# Patient Record
Sex: MALE | Race: WHITE | Employment: STUDENT | URBAN - METROPOLITAN AREA
[De-identification: names, ages, dates, MRNs, and addresses within clinical notes are randomized per-mention and may not be internally consistent; named-entity substitution may affect disease eponyms.]

---

## 2018-01-17 ENCOUNTER — OFFICE VISIT (OUTPATIENT)
Dept: FAMILY MEDICINE | Facility: CLINIC | Age: 23
End: 2018-01-17
Payer: COMMERCIAL

## 2018-01-17 VITALS
BODY MASS INDEX: 29.74 KG/M2 | HEART RATE: 82 BPM | TEMPERATURE: 99.1 F | DIASTOLIC BLOOD PRESSURE: 78 MMHG | OXYGEN SATURATION: 96 % | SYSTOLIC BLOOD PRESSURE: 126 MMHG | RESPIRATION RATE: 16 BRPM | WEIGHT: 178.5 LBS | HEIGHT: 65 IN

## 2018-01-17 DIAGNOSIS — Z23 NEED FOR PROPHYLACTIC VACCINATION AND INOCULATION AGAINST INFLUENZA: ICD-10-CM

## 2018-01-17 DIAGNOSIS — Z00.00 ENCOUNTER FOR ROUTINE ADULT HEALTH EXAMINATION WITHOUT ABNORMAL FINDINGS: Primary | ICD-10-CM

## 2018-01-17 DIAGNOSIS — K21.9 GASTROESOPHAGEAL REFLUX DISEASE WITHOUT ESOPHAGITIS: ICD-10-CM

## 2018-01-17 DIAGNOSIS — Z11.3 SCREENING EXAMINATION FOR VENEREAL DISEASE: ICD-10-CM

## 2018-01-17 LAB
ERYTHROCYTE [DISTWIDTH] IN BLOOD BY AUTOMATED COUNT: 13.5 % (ref 10–15)
HCT VFR BLD AUTO: 42.8 % (ref 40–53)
HGB BLD-MCNC: 14.7 G/DL (ref 13.3–17.7)
MCH RBC QN AUTO: 28.5 PG (ref 26.5–33)
MCHC RBC AUTO-ENTMCNC: 34.3 G/DL (ref 31.5–36.5)
MCV RBC AUTO: 83 FL (ref 78–100)
PLATELET # BLD AUTO: 231 10E9/L (ref 150–450)
RBC # BLD AUTO: 5.15 10E12/L (ref 4.4–5.9)
WBC # BLD AUTO: 6.1 10E9/L (ref 4–11)

## 2018-01-17 PROCEDURE — 80061 LIPID PANEL: CPT | Performed by: FAMILY MEDICINE

## 2018-01-17 PROCEDURE — 87491 CHLMYD TRACH DNA AMP PROBE: CPT | Performed by: FAMILY MEDICINE

## 2018-01-17 PROCEDURE — 99385 PREV VISIT NEW AGE 18-39: CPT | Mod: 25 | Performed by: FAMILY MEDICINE

## 2018-01-17 PROCEDURE — 85027 COMPLETE CBC AUTOMATED: CPT | Performed by: FAMILY MEDICINE

## 2018-01-17 PROCEDURE — 80053 COMPREHEN METABOLIC PANEL: CPT | Performed by: FAMILY MEDICINE

## 2018-01-17 PROCEDURE — 90471 IMMUNIZATION ADMIN: CPT | Performed by: FAMILY MEDICINE

## 2018-01-17 PROCEDURE — 36415 COLL VENOUS BLD VENIPUNCTURE: CPT | Performed by: FAMILY MEDICINE

## 2018-01-17 PROCEDURE — 87591 N.GONORRHOEAE DNA AMP PROB: CPT | Performed by: FAMILY MEDICINE

## 2018-01-17 PROCEDURE — 90686 IIV4 VACC NO PRSV 0.5 ML IM: CPT | Performed by: FAMILY MEDICINE

## 2018-01-17 ASSESSMENT — PATIENT HEALTH QUESTIONNAIRE - PHQ9
SUM OF ALL RESPONSES TO PHQ QUESTIONS 1-9: 12
10. IF YOU CHECKED OFF ANY PROBLEMS, HOW DIFFICULT HAVE THESE PROBLEMS MADE IT FOR YOU TO DO YOUR WORK, TAKE CARE OF THINGS AT HOME, OR GET ALONG WITH OTHER PEOPLE: SOMEWHAT DIFFICULT
SUM OF ALL RESPONSES TO PHQ QUESTIONS 1-9: 12

## 2018-01-17 NOTE — NURSING NOTE
"Chief Complaint   Patient presents with     Physical     not fasting     Gastrophageal Reflux       Initial /78 (BP Location: Right arm, Patient Position: Sitting, Cuff Size: Adult Regular)  Pulse 82  Temp 99.1  F (37.3  C) (Tympanic)  Resp 16  Ht 5' 5.39\" (1.661 m)  Wt 178 lb 8 oz (81 kg)  SpO2 96%  BMI 29.35 kg/m2 Estimated body mass index is 29.35 kg/(m^2) as calculated from the following:    Height as of this encounter: 5' 5.39\" (1.661 m).    Weight as of this encounter: 178 lb 8 oz (81 kg).  Medication Reconciliation: complete     Princess JOSELINE Bansal CMA      "

## 2018-01-17 NOTE — NURSING NOTE
Injectable Influenza Immunization Documentation    1.  Are you sick today? (Fever of 100.5 or higher on the day of the clinic)   No    2.  Have you ever had Guillain-Herndon Syndrome within 6 weeks of an influenza vaccionation?  No    3. Do you have a life-threatening allergy to eggs?  No    4. Do you have a life-threatening allergy to a component of the vaccine? May include antibiotics, gelatin or latex.  No     5. Have you ever had a reaction to a dose of flu vaccine that needed immediate medical attention?  No     Form completed by Princess JOSELINE Bansal CMA

## 2018-01-17 NOTE — MR AVS SNAPSHOT
After Visit Summary   1/17/2018    Benjamin Behar    MRN: [id number]           Patient Information     Date Of Birth          1995        Visit Information        Provider Department      1/17/2018 2:30 PM Jairo Rosa MD [facility]        Today's Diagnoses     Encounter for routine adult health examination without abnormal findings    -  1    Screening examination for venereal disease        Need for prophylactic vaccination and inoculation against influenza        Gastroesophageal reflux disease without esophagitis           Follow-ups after your visit        Who to contact     If you have questions or need follow up information about today's clinic visit or your schedule please contact [facility] directly at [phone].  Normal or non-critical lab and imaging results will be communicated to you by MyChart, letter or phone within 4 business days after the clinic has received the results. If you do not hear from us within 7 days, please contact the clinic through [email or web address] or phone. If you have a critical or abnormal lab result, we will notify you by phone as soon as possible.  Submit refill requests through [email or web address] or call your pharmacy and they will forward the refill request to us. Please allow 3 business days for your refill to be completed.          Additional Information About Your Visit        MyChart Information     [email or web address] gives you secure access to your electronic health record. If you see a primary care provider, you can also send messages to your care team and make appointments. If you have questions, please call your primary care clinic.  If you do not have a primary care provider, please call [phone] and they will assist you.        Care EveryWhere ID     This is your Care EveryWhere ID. This could be used by other organizations to access your [facility] medical records  [id number]        Your Vitals Were  "    Pulse Temperature Respirations Height Pulse Oximetry BMI (Body Mass Index)    82 99.1  F (37.3  C) (Tympanic) 16 5' 5.39\" (1.661 m) 96% 29.35 kg/m2       Blood Pressure from Last 3 Encounters:   01/17/18 126/78   02/15/14 136/79    Weight from Last 3 Encounters:   01/17/18 178 lb 8 oz (81 kg)   02/15/14 150 lb (68 kg) (41 %)*     * Growth percentiles are based on CDC 2-20 Years data.              We Performed the Following     CBC with platelets     CHLAMYDIA TRACHOMATIS PCR     Comprehensive metabolic panel     HC FLU VAC PRESRV FREE QUAD SPLIT VIR 3+YRS IM  [[id number]]     Lipid panel reflex to direct LDL Non-fasting     NEISSERIA GONORRHOEA PCR     VACCINE ADMINISTRATION, INITIAL        Primary Care Provider Office Phone # Fax #    [facility] [phone]       [address] [facility] [facility] [address]  [place] [id number]        Equal Access to Services     TOBIAS AJ : Hadii yvette ku hadasho Soomaali, waaxda luqadaha, qaybta kaalmada adeegyada, waxay angiein haycarlenen sergio walden . So [facility] [phone].    ATENCIÓN: Si habla español, tiene a sanches disposición servicios gratuitos de asistencia lingüística. Llame al [phone].    We comply with applicable federal civil rights laws and [place] laws. We do not discriminate on the basis of race, color, national origin, age, disability, sex, sexual orientation, or gender identity.            Thank you!     Thank you for choosing [facility]  for your care. Our goal is always to provide you with excellent care. Hearing back from our patients is one way we can continue to improve our services. Please take a few minutes to complete the written survey that you may receive in the mail after your visit with us. Thank you!             Your Updated Medication List - Protect others around you: Learn how to safely use, store and throw away your medicines at www.disposemymeds.org.          This list " is accurate as of: 1/17/18  3:03 PM.  Always use your most recent med list.                   Brand Name Dispense Instructions for use Diagnosis    ESCITALOPRAM OXALATE PO      Take 5 mg by mouth daily

## 2018-01-17 NOTE — PROGRESS NOTES
SUBJECTIVE:   CC: Benjamin Behar is an 22 year old male who presents for preventative health visit.     Physical   Annual:     Getting at least 3 servings of Calcium per day::  Yes    Bi-annual eye exam::  Yes    Dental care twice a year::  NO    Sleep apnea or symptoms of sleep apnea::  Daytime drowsiness    Diet::  Regular (no restrictions)    Frequency of exercise::  1 day/week    Duration of exercise::  Less than 15 minutes    Taking medications regularly::  Yes    Medication side effects::  Not applicable    Additional concerns today::  No              Answers for HPI/ROS submitted by the patient on 1/17/2018   PHQ-2 Score: 5  If you checked off any problems, how difficult have these problems made it for you to do your work, take care of things at home, or get along with other people?: Somewhat difficult  PHQ9 TOTAL SCORE: 12      GERD/Heartburn      Duration: months    Description (location/character/radiation): LLQ abdominal cramps when eating spicy foods    Intensity:  mild, moderate    Accompanying signs and symptoms:  food getting stuck: no   nausea/vomiting/blood: YES nausea  abdominal pain: YES  black/tarry or bloody stools: no :    History (similar episodes/previous evaluation): None    Precipitating or alleviating factors:  worse with spicy foods and caffeinated drinks.  current NSAID/Aspirin use: no     Therapies tried and outcome: antacids and changing diet        Today's PHQ-2 Score:   PHQ-2 ( 1999 Pfizer) 1/17/2018   Q1: Little interest or pleasure in doing things 3   Q2: Feeling down, depressed or hopeless 2   PHQ-2 Score 5   Q1: Little interest or pleasure in doing things Nearly every day   Q2: Feeling down, depressed or hopeless More than half the days   PHQ-2 Score 5       Abuse: Current or Past(Physical, Sexual or Emotional)- No  Do you feel safe in your environment - Yes    Social History   Substance Use Topics     Smoking status: Never Smoker     Smokeless tobacco: Never Used     Alcohol use  "Yes      Comment: socially     Alcohol Use 1/17/2018   If you drink alcohol, do you typically have greater than 3 drinks per day OR greater than 7 drinks per week?   No   No flowsheet data found.      Last PSA: No results found for: PSA    Reviewed orders with patient. Reviewed health maintenance and updated orders accordingly - Yes  Labs reviewed in EPIC    Reviewed and updated as needed this visit by clinical staff  Tobacco  Allergies  Meds  Problems  Med Hx  Surg Hx  Fam Hx  Soc Hx          Reviewed and updated as needed this visit by Provider  Allergies  Meds  Problems            Review of Systems  C: NEGATIVE for fever, chills, change in weight  I: NEGATIVE for worrisome rashes, moles or lesions  E: NEGATIVE for vision changes or irritation  ENT: NEGATIVE for ear, mouth and throat problems  R: NEGATIVE for significant cough or SOB  CV: NEGATIVE for chest pain, palpitations or peripheral edema  GI: POSITIVE for heartburn or reflux   male: negative for dysuria, hematuria, decreased urinary stream, erectile dysfunction, urethral discharge  M: NEGATIVE for significant arthralgias or myalgia  N: NEGATIVE for weakness, dizziness or paresthesias  P: NEGATIVE for changes in mood or affect    OBJECTIVE:   /78 (BP Location: Right arm, Patient Position: Sitting, Cuff Size: Adult Regular)  Pulse 82  Temp 99.1  F (37.3  C) (Tympanic)  Resp 16  Ht 5' 5.39\" (1.661 m)  Wt 178 lb 8 oz (81 kg)  SpO2 96%  BMI 29.35 kg/m2    Physical Exam  GENERAL: healthy, alert and no distress  EYES: Eyes grossly normal to inspection, PERRL and conjunctivae and sclerae normal  HENT: ear canals and TM's normal, nose and mouth without ulcers or lesions  NECK: no adenopathy, no asymmetry, masses, or scars and thyroid normal to palpation  RESP: lungs clear to auscultation - no rales, rhonchi or wheezes  CV: regular rate and rhythm, normal S1 S2, no S3 or S4, no murmur, click or rub, no peripheral edema and peripheral pulses " "strong  ABDOMEN: soft, nontender, no hepatosplenomegaly, no masses and bowel sounds normal   (male): normal male genitalia without lesions or urethral discharge, no hernia  MS: no gross musculoskeletal defects noted, no edema  SKIN: no suspicious lesions or rashes  NEURO: Normal strength and tone, mentation intact and speech normal  PSYCH: mentation appears normal, affect normal/bright    ASSESSMENT/PLAN:   Glen was seen today for physical and gastrophageal reflux.    Diagnoses and all orders for this visit:    Encounter for routine adult health examination without abnormal findings  -     Comprehensive metabolic panel  -     Lipid panel reflex to direct LDL Non-fasting  -     CBC with platelets    Screening examination for venereal disease  -     NEISSERIA GONORRHOEA PCR  -     CHLAMYDIA TRACHOMATIS PCR    Need for prophylactic vaccination and inoculation against influenza  -     HC FLU VAC PRESRV FREE QUAD SPLIT VIR 3+YRS IM  [[id number]]  -     VACCINE ADMINISTRATION, INITIAL    Gastroesophageal reflux disease without esophagitis  -     Comprehensive metabolic panel        COUNSELING:   Reviewed preventive health counseling, as reflected in patient instructions       Regular exercise       Healthy diet/nutrition       Immunizations    Vaccinated for: Influenza      Decrease foods that irritate stomach         reports that he has never smoked. He has never used smokeless tobacco.      Estimated body mass index is 29.35 kg/(m^2) as calculated from the following:    Height as of this encounter: 5' 5.39\" (1.661 m).    Weight as of this encounter: 178 lb 8 oz (81 kg).   Weight management plan: Discussed healthy diet and exercise guidelines and patient will follow up in 12 months in clinic to re-evaluate.   Pt will check on his previous immunizations.  He may be due for Tdap but he wants to wait on that now    Counseling Resources:  ATP IV Guidelines  Pooled Cohorts Equation Calculator  FRAX Risk Assessment  ICSI " Preventive Guidelines  Dietary Guidelines for Americans, 2010  USDA's MyPlate  ASA Prophylaxis  Lung CA Screening    Jairo Rosa MD  [facility]

## 2018-01-18 LAB
ALBUMIN SERPL-MCNC: 4 G/DL (ref 3.4–5)
ALP SERPL-CCNC: 63 U/L (ref 40–150)
ALT SERPL W P-5'-P-CCNC: 39 U/L (ref 0–70)
ANION GAP SERPL CALCULATED.3IONS-SCNC: 6 MMOL/L (ref 3–14)
AST SERPL W P-5'-P-CCNC: 22 U/L (ref 0–45)
BILIRUB SERPL-MCNC: 0.3 MG/DL (ref 0.2–1.3)
BUN SERPL-MCNC: 12 MG/DL (ref 7–30)
C TRACH DNA SPEC QL NAA+PROBE: NEGATIVE
CALCIUM SERPL-MCNC: 8.4 MG/DL (ref 8.5–10.1)
CHLORIDE SERPL-SCNC: 106 MMOL/L (ref 94–109)
CHOLEST SERPL-MCNC: 149 MG/DL
CO2 SERPL-SCNC: 28 MMOL/L (ref 20–32)
CREAT SERPL-MCNC: 1.01 MG/DL (ref 0.66–1.25)
GFR SERPL CREATININE-BSD FRML MDRD: >90 ML/MIN/1.7M2
GLUCOSE SERPL-MCNC: 94 MG/DL (ref 70–99)
HDLC SERPL-MCNC: 30 MG/DL
LDLC SERPL CALC-MCNC: 57 MG/DL
N GONORRHOEA DNA SPEC QL NAA+PROBE: NEGATIVE
NONHDLC SERPL-MCNC: 119 MG/DL
POTASSIUM SERPL-SCNC: 4.1 MMOL/L (ref 3.4–5.3)
PROT SERPL-MCNC: 7 G/DL (ref 6.8–8.8)
SODIUM SERPL-SCNC: 140 MMOL/L (ref 133–144)
SPECIMEN SOURCE: NORMAL
SPECIMEN SOURCE: NORMAL
TRIGL SERPL-MCNC: 309 MG/DL

## 2018-01-19 ASSESSMENT — PATIENT HEALTH QUESTIONNAIRE - PHQ9: SUM OF ALL RESPONSES TO PHQ QUESTIONS 1-9: 12

## 2019-09-24 ENCOUNTER — OFFICE VISIT (OUTPATIENT)
Dept: FAMILY MEDICINE | Facility: CLINIC | Age: 24
End: 2019-09-24
Payer: COMMERCIAL

## 2019-09-24 VITALS
DIASTOLIC BLOOD PRESSURE: 80 MMHG | HEART RATE: 80 BPM | TEMPERATURE: 98.4 F | HEIGHT: 65 IN | BODY MASS INDEX: 30.6 KG/M2 | WEIGHT: 183.7 LBS | OXYGEN SATURATION: 99 % | SYSTOLIC BLOOD PRESSURE: 128 MMHG | RESPIRATION RATE: 16 BRPM

## 2019-09-24 DIAGNOSIS — Z23 NEED FOR VACCINATION: ICD-10-CM

## 2019-09-24 DIAGNOSIS — Z00.00 ROUTINE GENERAL MEDICAL EXAMINATION AT A HEALTH CARE FACILITY: Primary | ICD-10-CM

## 2019-09-24 DIAGNOSIS — R10.13 DYSPEPSIA: ICD-10-CM

## 2019-09-24 PROCEDURE — 99395 PREV VISIT EST AGE 18-39: CPT | Mod: 25 | Performed by: PHYSICIAN ASSISTANT

## 2019-09-24 PROCEDURE — 90715 TDAP VACCINE 7 YRS/> IM: CPT | Performed by: PHYSICIAN ASSISTANT

## 2019-09-24 PROCEDURE — 90471 IMMUNIZATION ADMIN: CPT | Performed by: PHYSICIAN ASSISTANT

## 2019-09-24 ASSESSMENT — MIFFLIN-ST. JEOR: SCORE: 1745.14

## 2019-09-24 ASSESSMENT — PATIENT HEALTH QUESTIONNAIRE - PHQ9
10. IF YOU CHECKED OFF ANY PROBLEMS, HOW DIFFICULT HAVE THESE PROBLEMS MADE IT FOR YOU TO DO YOUR WORK, TAKE CARE OF THINGS AT HOME, OR GET ALONG WITH OTHER PEOPLE: SOMEWHAT DIFFICULT
SUM OF ALL RESPONSES TO PHQ QUESTIONS 1-9: 11
SUM OF ALL RESPONSES TO PHQ QUESTIONS 1-9: 11

## 2019-09-24 NOTE — NURSING NOTE
Prior to immunization administration, verified patients identity using patient s name and date of birth. Please see Immunization Activity for additional information.     Screening Questionnaire for Adult Immunization    Are you sick today?   No   Do you have allergies to medications, food, a vaccine component or latex?   No   Have you ever had a serious reaction after receiving a vaccination?   No   Do you have a long-term health problem with heart disease, lung disease, asthma, kidney disease, metabolic disease (e.g. diabetes), anemia, or other blood disorder?   No   Do you have cancer, leukemia, HIV/AIDS, or any other immune system problem?   No   In the past 3 months, have you taken medications that affect  your immune system, such as prednisone, other steroids, or anticancer drugs; drugs for the treatment of rheumatoid arthritis, Crohn s disease, or psoriasis; or have you had radiation treatments?   No   Have you had a seizure, or a brain or other nervous system problem?   No   During the past year, have you received a transfusion of blood or blood     products, or been given immune (gamma) globulin or antiviral drug?   No   For women: Are you pregnant or is there a chance you could become        pregnant during the next month?   No   Have you received any vaccinations in the past 4 weeks?   No     Immunization questionnaire answers were all negative.        Per orders of David Neal PA-C, injection of TDAP given by Kristie New. Patient instructed to remain in clinic for 15 minutes afterwards, and to report any adverse reaction to me immediately.       Screening performed by Kristie New on 9/24/2019 at 5:17 PM.

## 2019-09-24 NOTE — PROGRESS NOTES
SUBJECTIVE:   CC: Benjamin Behar is an 24 year old male who presents for preventative health visit.     Healthy Habits:     Getting at least 3 servings of Calcium per day:  Yes    Bi-annual eye exam:  Yes    Dental care twice a year:  NO    Sleep apnea or symptoms of sleep apnea:  Daytime drowsiness    Diet:  Breakfast skipped    Frequency of exercise:  None    Taking medications regularly:  Yes    Medication side effects:  Not applicable    PHQ-2 Total Score: 4    Additional concerns today:  Yes    25 y/o new to me male here for well exam.  Overall things are going well.  Describes work as stressful, but does overall enjoy.  He admits that he does not eat healthy, eats our far too often, and eats too quickly.  Does get some upset stomach around food. OTC fiber supplement has helped.    Has had some mental health issues in the past, does not feel that they are a problem at this time.      Had fasting lab work done last year.  Cholesterol a bit off, especially trigs over 300.  Diet related.        Today's PHQ-2 Score:   PHQ-2 ( 1999 Pfizer) 9/24/2019   Q1: Little interest or pleasure in doing things 2   Q2: Feeling down, depressed or hopeless 2   PHQ-2 Score 4   Q1: Little interest or pleasure in doing things More than half the days   Q2: Feeling down, depressed or hopeless More than half the days   PHQ-2 Score 4       Abuse: Current or Past(Physical, Sexual or Emotional)- No  Do you feel safe in your environment? Yes    Social History     Tobacco Use     Smoking status: Never Smoker     Smokeless tobacco: Never Used   Substance Use Topics     Alcohol use: Yes     Comment: socially     If you drink alcohol do you typically have >3 drinks per day or >7 drinks per week? No    Alcohol Use 9/24/2019   Prescreen: >3 drinks/day or >7 drinks/week? No   Prescreen: >3 drinks/day or >7 drinks/week? -   No flowsheet data found.    Last PSA: No results found for: PSA    Reviewed orders with patient. Reviewed health maintenance  "and updated orders accordingly - Yes  BP Readings from Last 3 Encounters:   09/24/19 128/80   01/17/18 126/78   02/15/14 136/79    Wt Readings from Last 3 Encounters:   09/24/19 83.3 kg (183 lb 11.2 oz)   01/17/18 81 kg (178 lb 8 oz)   02/15/14 68 kg (150 lb) (41 %)*     * Growth percentiles are based on [facility] (Boys, 2-20 Years) data.                    Reviewed and updated as needed this visit by clinical staff  Tobacco         Reviewed and updated as needed this visit by Provider            Review of Systems  CONSTITUTIONAL: NEGATIVE for fever, chills, change in weight  INTEGUMENTARY/SKIN: NEGATIVE for worrisome rashes, moles or lesions  EYES: NEGATIVE for vision changes or irritation  ENT: NEGATIVE for ear, mouth and throat problems  RESP: NEGATIVE for significant cough or SOB  CV: NEGATIVE for chest pain, palpitations or peripheral edema  GI: NEGATIVE for nausea, abdominal pain, heartburn, or change in bowel habits   male: negative for dysuria, hematuria, decreased urinary stream, erectile dysfunction, urethral discharge  MUSCULOSKELETAL: NEGATIVE for significant arthralgias or myalgia  NEURO: NEGATIVE for weakness, dizziness or paresthesias  PSYCHIATRIC: NEGATIVE for changes in mood or affect    OBJECTIVE:   /80   Pulse 80   Temp 98.4  F (36.9  C) (Oral)   Resp 16   Ht 1.651 m (5' 5\")   Wt 83.3 kg (183 lb 11.2 oz)   SpO2 99%   BMI 30.57 kg/m      Physical Exam  GENERAL: alert and no distress  EYES: Eyes grossly normal to inspection, PERRL and conjunctivae and sclerae normal  HENT: ear canals and TM's normal, nose and mouth without ulcers or lesions  NECK: no adenopathy, no asymmetry, masses, or scars and thyroid normal to palpation  RESP: lungs clear to auscultation - no rales, rhonchi or wheezes  CV: regular rate and rhythm, normal S1 S2, no S3 or S4, no murmur, click or rub, no peripheral edema and peripheral pulses strong  ABDOMEN: soft, nontender, no hepatosplenomegaly, no masses and bowel " "sounds normal  MS: no gross musculoskeletal defects noted, no edema  SKIN: no suspicious lesions or rashes  NEURO: Normal strength and tone, mentation intact and speech normal  PSYCH: mentation appears normal, affect normal/bright    Diagnostic Test Results:  Labs reviewed in Epic    ASSESSMENT/PLAN:   1. Routine general medical examination at a health care facility  Offered fasting labs, but had done last year, ok to hold off.      2. Need for vaccination    - TDAP VACCINE  - ADMIN 1st VACCINE    3. Dyspepsia  Discussed dietary changes, along with slowing down eating and chewing food better.      COUNSELING:   Reviewed preventive health counseling, as reflected in patient instructions       Regular exercise       Healthy diet/nutrition       Immunizations    Vaccinated for: TDAP          Estimated body mass index is 30.57 kg/m  as calculated from the following:    Height as of this encounter: 1.651 m (5' 5\").    Weight as of this encounter: 83.3 kg (183 lb 11.2 oz).     Weight management plan: Discussed healthy diet and exercise guidelines     reports that he has never smoked. He has never used smokeless tobacco.      Counseling Resources:  ATP IV Guidelines  Pooled Cohorts Equation Calculator  FRAX Risk Assessment  ICSI Preventive Guidelines  Dietary Guidelines for Americans, 2010  USDA's MyPlate  ASA Prophylaxis  Lung CA Screening    Bryan Neal PA-C  [facility]  "

## 2020-03-02 ENCOUNTER — HEALTH MAINTENANCE LETTER (OUTPATIENT)
Age: 25
End: 2020-03-02

## 2020-12-20 ENCOUNTER — HEALTH MAINTENANCE LETTER (OUTPATIENT)
Age: 25
End: 2020-12-20

## 2021-10-03 ENCOUNTER — HEALTH MAINTENANCE LETTER (OUTPATIENT)
Age: 26
End: 2021-10-03

## 2022-01-23 ENCOUNTER — HEALTH MAINTENANCE LETTER (OUTPATIENT)
Age: 27
End: 2022-01-23

## 2022-09-10 ENCOUNTER — HEALTH MAINTENANCE LETTER (OUTPATIENT)
Age: 27
End: 2022-09-10

## 2023-04-30 ENCOUNTER — HEALTH MAINTENANCE LETTER (OUTPATIENT)
Age: 28
End: 2023-04-30

## 2023-08-16 ENCOUNTER — OFFICE VISIT (OUTPATIENT)
Dept: PEDIATRICS | Facility: CLINIC | Age: 28
End: 2023-08-16
Payer: COMMERCIAL

## 2023-08-16 VITALS
SYSTOLIC BLOOD PRESSURE: 140 MMHG | TEMPERATURE: 98.9 F | OXYGEN SATURATION: 97 % | RESPIRATION RATE: 16 BRPM | BODY MASS INDEX: 32.44 KG/M2 | HEIGHT: 65 IN | WEIGHT: 194.7 LBS | HEART RATE: 112 BPM | DIASTOLIC BLOOD PRESSURE: 84 MMHG

## 2023-08-16 DIAGNOSIS — Z00.00 ROUTINE GENERAL MEDICAL EXAMINATION AT A HEALTH CARE FACILITY: Primary | ICD-10-CM

## 2023-08-16 DIAGNOSIS — Z13.220 SCREENING FOR CHOLESTEROL LEVEL: ICD-10-CM

## 2023-08-16 DIAGNOSIS — R41.840 INATTENTION: ICD-10-CM

## 2023-08-16 DIAGNOSIS — Z13.1 SCREENING FOR DIABETES MELLITUS: ICD-10-CM

## 2023-08-16 DIAGNOSIS — I10 ESSENTIAL HYPERTENSION: ICD-10-CM

## 2023-08-16 LAB
ERYTHROCYTE [DISTWIDTH] IN BLOOD BY AUTOMATED COUNT: 12.7 % (ref 10–15)
HBA1C MFR BLD: 5.6 % (ref 0–5.6)
HCT VFR BLD AUTO: 45.6 % (ref 35–53)
HGB BLD-MCNC: 15.5 G/DL (ref 11.7–17.7)
MCH RBC QN AUTO: 28 PG (ref 26.5–33)
MCHC RBC AUTO-ENTMCNC: 34 G/DL (ref 31.5–36.5)
MCV RBC AUTO: 83 FL (ref 78–100)
PLATELET # BLD AUTO: 240 10E3/UL (ref 150–450)
RBC # BLD AUTO: 5.53 10E6/UL (ref 3.8–5.9)
WBC # BLD AUTO: 6.7 10E3/UL (ref 4–11)

## 2023-08-16 PROCEDURE — 85027 COMPLETE CBC AUTOMATED: CPT | Performed by: STUDENT IN AN ORGANIZED HEALTH CARE EDUCATION/TRAINING PROGRAM

## 2023-08-16 PROCEDURE — 80048 BASIC METABOLIC PNL TOTAL CA: CPT | Performed by: STUDENT IN AN ORGANIZED HEALTH CARE EDUCATION/TRAINING PROGRAM

## 2023-08-16 PROCEDURE — 99385 PREV VISIT NEW AGE 18-39: CPT | Mod: GC | Performed by: STUDENT IN AN ORGANIZED HEALTH CARE EDUCATION/TRAINING PROGRAM

## 2023-08-16 PROCEDURE — 36415 COLL VENOUS BLD VENIPUNCTURE: CPT | Performed by: STUDENT IN AN ORGANIZED HEALTH CARE EDUCATION/TRAINING PROGRAM

## 2023-08-16 PROCEDURE — 99214 OFFICE O/P EST MOD 30 MIN: CPT | Mod: GC | Performed by: STUDENT IN AN ORGANIZED HEALTH CARE EDUCATION/TRAINING PROGRAM

## 2023-08-16 PROCEDURE — 84443 ASSAY THYROID STIM HORMONE: CPT | Performed by: STUDENT IN AN ORGANIZED HEALTH CARE EDUCATION/TRAINING PROGRAM

## 2023-08-16 PROCEDURE — 83036 HEMOGLOBIN GLYCOSYLATED A1C: CPT | Performed by: STUDENT IN AN ORGANIZED HEALTH CARE EDUCATION/TRAINING PROGRAM

## 2023-08-16 PROCEDURE — 80061 LIPID PANEL: CPT | Performed by: STUDENT IN AN ORGANIZED HEALTH CARE EDUCATION/TRAINING PROGRAM

## 2023-08-16 RX ORDER — LISINOPRIL 5 MG/1
5 TABLET ORAL DAILY
Qty: 90 TABLET | Refills: 3 | Status: CANCELLED | OUTPATIENT
Start: 2023-08-16

## 2023-08-16 SDOH — ECONOMIC STABILITY: FOOD INSECURITY: WITHIN THE PAST 12 MONTHS, YOU WORRIED THAT YOUR FOOD WOULD RUN OUT BEFORE YOU GOT MONEY TO BUY MORE.: NEVER TRUE

## 2023-08-16 SDOH — ECONOMIC STABILITY: INCOME INSECURITY: IN THE LAST 12 MONTHS, WAS THERE A TIME WHEN YOU WERE NOT ABLE TO PAY THE MORTGAGE OR RENT ON TIME?: NO

## 2023-08-16 SDOH — ECONOMIC STABILITY: FOOD INSECURITY: WITHIN THE PAST 12 MONTHS, THE FOOD YOU BOUGHT JUST DIDN'T LAST AND YOU DIDN'T HAVE MONEY TO GET MORE.: NEVER TRUE

## 2023-08-16 SDOH — ECONOMIC STABILITY: TRANSPORTATION INSECURITY
IN THE PAST 12 MONTHS, HAS LACK OF TRANSPORTATION KEPT YOU FROM MEETINGS, WORK, OR FROM GETTING THINGS NEEDED FOR DAILY LIVING?: NO

## 2023-08-16 SDOH — HEALTH STABILITY: PHYSICAL HEALTH: ON AVERAGE, HOW MANY MINUTES DO YOU ENGAGE IN EXERCISE AT THIS LEVEL?: 20 MIN

## 2023-08-16 SDOH — ECONOMIC STABILITY: INCOME INSECURITY: HOW HARD IS IT FOR YOU TO PAY FOR THE VERY BASICS LIKE FOOD, HOUSING, MEDICAL CARE, AND HEATING?: NOT HARD AT ALL

## 2023-08-16 SDOH — HEALTH STABILITY: PHYSICAL HEALTH: ON AVERAGE, HOW MANY DAYS PER WEEK DO YOU ENGAGE IN MODERATE TO STRENUOUS EXERCISE (LIKE A BRISK WALK)?: 0 DAYS

## 2023-08-16 SDOH — ECONOMIC STABILITY: TRANSPORTATION INSECURITY
IN THE PAST 12 MONTHS, HAS THE LACK OF TRANSPORTATION KEPT YOU FROM MEDICAL APPOINTMENTS OR FROM GETTING MEDICATIONS?: NO

## 2023-08-16 ASSESSMENT — ENCOUNTER SYMPTOMS
ARTHRALGIAS: 0
FREQUENCY: 0
SORE THROAT: 0
PALPITATIONS: 0
CHILLS: 0
DIARRHEA: 0
HEADACHES: 0
HEARTBURN: 0
NERVOUS/ANXIOUS: 0
NAUSEA: 0
ABDOMINAL PAIN: 0
MYALGIAS: 0
WEAKNESS: 0
HEMATOCHEZIA: 0
SHORTNESS OF BREATH: 0
COUGH: 0
DYSURIA: 0
HEMATURIA: 0
DIZZINESS: 0
JOINT SWELLING: 0
BREAST MASS: 0
EYE PAIN: 0
FEVER: 0
CONSTIPATION: 0
PARESTHESIAS: 0

## 2023-08-16 ASSESSMENT — SOCIAL DETERMINANTS OF HEALTH (SDOH)
HOW OFTEN DO YOU GET TOGETHER WITH FRIENDS OR RELATIVES?: THREE TIMES A WEEK
DO YOU BELONG TO ANY CLUBS OR ORGANIZATIONS SUCH AS CHURCH GROUPS UNIONS, FRATERNAL OR ATHLETIC GROUPS, OR SCHOOL GROUPS?: NO
IN A TYPICAL WEEK, HOW MANY TIMES DO YOU TALK ON THE PHONE WITH FAMILY, FRIENDS, OR NEIGHBORS?: MORE THAN THREE TIMES A WEEK
HOW OFTEN DO YOU ATTEND CHURCH OR RELIGIOUS SERVICES?: NEVER

## 2023-08-16 ASSESSMENT — LIFESTYLE VARIABLES
HOW MANY STANDARD DRINKS CONTAINING ALCOHOL DO YOU HAVE ON A TYPICAL DAY: 1 OR 2
HOW OFTEN DO YOU HAVE A DRINK CONTAINING ALCOHOL: 2-4 TIMES A MONTH
SKIP TO QUESTIONS 9-10: 1
HOW OFTEN DO YOU HAVE SIX OR MORE DRINKS ON ONE OCCASION: NEVER
AUDIT-C TOTAL SCORE: 2

## 2023-08-16 ASSESSMENT — PAIN SCALES - GENERAL: PAINLEVEL: NO PAIN (0)

## 2023-08-16 NOTE — PATIENT INSTRUCTIONS
Keep track of medications (tylenol, ibuprofen), caffeine, ect with the headaches.     I would wear your glasses, consider blue light glasses, and get back in with your eye doctors.     Consider starting lisinopril, a blood pressure medication but we will work on lifestyle changes first. Continue to record your blood pressure at home too! Follow up in 3-6 months to check in on BP.     Preventive Health Recommendations  Male Ages 26 - 39    Yearly exam:             See your health care provider every year in order to  o   Review health changes.   o   Discuss preventive care.    o   Review your medicines if your doctor has prescribed any.  You should be tested each year for STDs (sexually transmitted diseases), if you re at risk.   After age 35, talk to your provider about cholesterol testing. If you are at risk for heart disease, have your cholesterol tested at least every 5 years.   If you are at risk for diabetes, you should have a diabetes test (fasting glucose).  Shots: Get a flu shot each year. Get a tetanus shot every 10 years.     Nutrition:  Eat at least 5 servings of fruits and vegetables daily.   Eat whole-grain bread, whole-wheat pasta and brown rice instead of white grains and rice.   Get adequate Calcium and Vitamin D.     Lifestyle  Exercise for at least 150 minutes a week (30 minutes a day, 5 days a week). This will help you control your weight and prevent disease.   Limit alcohol to one drink per day.   No smoking.   Wear sunscreen to prevent skin cancer.   See your dentist every six months for an exam and cleaning.

## 2023-08-16 NOTE — PROGRESS NOTES
SUBJECTIVE:   CC: Easton is an 28 year old who presents for preventative health visit.     Healthy Habits:     Getting at least 3 servings of Calcium per day:  Yes    Bi-annual eye exam:  Yes    Dental care twice a year:  NO    Sleep apnea or symptoms of sleep apnea:  Excessive snoring    Diet:  Regular (no restrictions)    Frequency of exercise:  None    Taking medications regularly:  Yes    Medication side effects:  Not applicable and None    Additional concerns today:  No    Headaches  Duration: 1-2 months   Description  Location: sometimes global but mostly bilateral temporal    Character: dull pain, achy  Frequency:  every other day lasting a couple hours   Intensity:  mild  Accompanying signs and symptoms:  Precipitating or Alleviating factors:  Nausea/vomiting: no  Dizziness: no  Weakness or numbness: no  Visual changes: none  Fever: no   Sinus or URI symptoms no   History  Head trauma: no   Family history of migraines: no   Previous tests for headaches: no   Neurologist evaluations: no   Able to do daily activities when headache present: YES  Wake with headaches: no   Daily pain medication use: YES- Advil  Any changes in: Not really   Precipitating or Alleviating factors (light/sound/sleep/caffeine): Caffeine slightly   Therapies tried and outcome: Ibuprofen (Advil, Motrin)    Outcome - slightly effective   Frequent/daily pain medication use: no     OCD as a child for which he was treated, no longer a concern. Some anxiety which he feels is chronic, more so when he is overwhelmed. Not in the way of his day to day living. His wife and him are concerned about ADD due to lack of sleep and inattention, and hyperactivity. His boss also wonders if this is a thing.     Headaches have been more difficult to control for the past 1-2 months. More temporal, usually bilateral. Last a few hours, occurring every few days, sometimes daily for a few days but has not noticed any strong pattern. 3-4 a week. Ibuprofen is helping,  which is new.     Has been drinking more coffee over the past few months as well, one coffee in the morning and maybe a half a coffee a day. More HA when he is stressed or tired.     Has been checking blood pressure at home, 130-140/80-90's even with sitting.     Have you ever done Advance Care Planning? (For example, a Health Directive, POLST, or a discussion with a medical provider or your loved ones about your wishes): No, advance care planning information given to patient to review.  Patient declined advance care planning discussion at this time.    Social History     Tobacco Use    Smoking status: Never    Smokeless tobacco: Never   Substance Use Topics    Alcohol use: Yes     Comment: socially             8/16/2023     3:10 PM   Alcohol Use   Prescreen: >3 drinks/day or >7 drinks/week? No       Last PSA: No results found for: PSA    Reviewed orders with patient. Reviewed health maintenance and updated orders accordingly - Yes    Reviewed and updated as needed this visit by clinical staff   Tobacco  Allergies  Meds     [facility]          Reviewed and updated as needed this visit by Provider         [facility]           Review of Systems   Constitutional:  Negative for chills and fever.   HENT:  Negative for congestion, ear pain, hearing loss and sore throat.    Eyes:  Negative for pain and visual disturbance.   Respiratory:  Negative for cough and shortness of breath.    Cardiovascular:  Negative for chest pain and palpitations.   Gastrointestinal:  Negative for abdominal pain, constipation, diarrhea and nausea.   Genitourinary:  Negative for dysuria, frequency, genital sores, hematuria and urgency.   Musculoskeletal:  Negative for arthralgias, joint swelling and myalgias.   Skin:  Negative for rash.   Neurological:  Negative for dizziness, weakness and headaches.   Psychiatric/Behavioral:  The patient is not nervous/anxious.      OBJECTIVE:   BP (!) 140/84   Pulse 112   Temp 98.9  F (37.2  C)   Resp 16   Ht  "1.656 m (5' 5.2\")   Wt 88.3 kg (194 lb 11.2 oz)   SpO2 97%   BMI 32.20 kg/m      Physical Exam  GENERAL: healthy, alert and no distress  EYES: Eyes grossly normal to inspection, PERRL and conjunctivae and sclerae normal  HENT: ear canals and TM's normal, nose and mouth without ulcers or lesions  RESP: lungs clear to auscultation - no rales, rhonchi or wheezes  CV: regular rate and rhythm, normal S1 S2, no S3 or S4, no murmur, click or rub, no peripheral edema and peripheral pulses strong  ABDOMEN: soft, nontender, no hepatosplenomegaly, no masses and bowel sounds normal  MS: no gross musculoskeletal defects noted, no edema  SKIN: no suspicious lesions or rashes  NEURO: Normal strength and tone, mentation intact and speech normal  PSYCH: mentation appears normal, affect normal/bright    ASSESSMENT/PLAN:   Glen was seen today for physical.  Diagnoses and all orders for this visit:    Routine general medical examination at a health care facility  -     Basic metabolic panel  (Ca, Cl, CO2, Creat, Gluc, K, Na, BUN); Future  -     Basic metabolic panel  (Ca, Cl, CO2, Creat, Gluc, K, Na, BUN)    Essential hypertension  Patient noted to have elevated blood pressure today, 140/84.  Has been checking his blood pressure at home with some frequency, also noting 130-140 for systolic readings.  This is new for him, he more reliably get readings of 120 systolic in years past.  He does note that he has been less active and gained weight since that time, he also has a strong family history of high blood pressure.  We will get screening labs as seen below, did discuss the initiation of lisinopril 5 mg, he wishes to hold off on medication at this time and work on lifestyle change.  We will see him back in 3 months to check in on his blood pressure and discuss medication further if applicable.  -     Basic metabolic panel  (Ca, Cl, CO2, Creat, Gluc, K, Na, BUN); Future  -     CBC with platelets; Future  -     TSH with free T4 " reflex; Future  -     Basic metabolic panel  (Ca, Cl, CO2, Creat, Gluc, K, Na, BUN)  -     CBC with platelets  -     TSH with free T4 reflex    Screening for diabetes mellitus  -     Hemoglobin A1c; Future  -     Hemoglobin A1c    Inattention  Patient's spouse and boss have both mentioned the possibility of ADHD in regard to his inattention and hyperactivity.  He was diagnosed with OCD as a child, this is no longer an issue for him discussed that this may be ADHD versus anxiety versus other, will we will refer him for mental health evaluation prior to starting any medication.  -     Adult Mental Health  Referral; Future    Screening for cholesterol level  -     Lipid panel reflex to direct LDL Non-fasting; Future  -     Lipid panel reflex to direct LDL Non-fasting    Other orders  -     PRIMARY CARE FOLLOW-UP SCHEDULING; Future    Patient has had 1 to 2 months of headaches, he is using intermittent ibuprofen and Tylenol which have been helpful, recently started drinking caffeine with more frequency, may need a new prescription for his glasses, does look at a computer screen for majority of the day.  Discussed keeping a diary of his headaches and these other associated things to see if there is a pattern or things that we can change.     COUNSELING:   Reviewed preventive health counseling, as reflected in patient instructions       Regular exercise       Healthy diet/nutrition       Vision screening    Ben D Behar reports that Ben D. Behar has never smoked. Ben D. Behar has never used smokeless tobacco.          Silvia Turner MD  [facility] submitted by the patient for this visit:  Annual Preventive Visit (Submitted on 8/16/2023)  Chief Complaint: Annual Exam:  Blood in stool: No  heartburn: No  peripheral edema: No  mood changes: No  Skin sensation changes: No  pelvic pain: No  vaginal bleeding: No  vaginal discharge: No  tenderness: No  breast mass: No  breast discharge:  No  impotence: No  penile discharge: No

## 2023-08-17 LAB
ANION GAP SERPL CALCULATED.3IONS-SCNC: 14 MMOL/L (ref 7–15)
BUN SERPL-MCNC: 14.4 MG/DL (ref 6–20)
CALCIUM SERPL-MCNC: 9.4 MG/DL (ref 8.6–10)
CHLORIDE SERPL-SCNC: 105 MMOL/L (ref 98–107)
CHOLEST SERPL-MCNC: 240 MG/DL
CREAT SERPL-MCNC: 1.15 MG/DL (ref 0.51–1.17)
DEPRECATED HCO3 PLAS-SCNC: 21 MMOL/L (ref 22–29)
GFR SERPL CREATININE-BSD FRML MDRD: 88 ML/MIN/1.73M2
GLUCOSE SERPL-MCNC: 84 MG/DL (ref 70–99)
HDLC SERPL-MCNC: 33 MG/DL
LDLC SERPL CALC-MCNC: 147 MG/DL
NONHDLC SERPL-MCNC: 207 MG/DL
POTASSIUM SERPL-SCNC: 4.2 MMOL/L (ref 3.4–5.3)
SODIUM SERPL-SCNC: 140 MMOL/L (ref 136–145)
TRIGL SERPL-MCNC: 299 MG/DL
TSH SERPL DL<=0.005 MIU/L-ACNC: 1.69 UIU/ML (ref 0.3–4.2)

## 2024-11-24 ENCOUNTER — HEALTH MAINTENANCE LETTER (OUTPATIENT)
Age: 29
End: 2024-11-24